# Patient Record
Sex: FEMALE | Race: WHITE | Employment: PART TIME | URBAN - METROPOLITAN AREA
[De-identification: names, ages, dates, MRNs, and addresses within clinical notes are randomized per-mention and may not be internally consistent; named-entity substitution may affect disease eponyms.]

---

## 2017-02-21 PROBLEM — O34.219 PREVIOUS CESAREAN SECTION COMPLICATING PREGNANCY: Status: ACTIVE | Noted: 2017-02-21

## 2017-02-21 PROBLEM — Z98.891 S/P CESAREAN SECTION: Status: ACTIVE | Noted: 2017-02-21

## 2022-04-14 ENCOUNTER — OFFICE VISIT (OUTPATIENT)
Dept: ENT CLINIC | Age: 32
End: 2022-04-14
Payer: COMMERCIAL

## 2022-04-14 VITALS
HEART RATE: 84 BPM | SYSTOLIC BLOOD PRESSURE: 133 MMHG | DIASTOLIC BLOOD PRESSURE: 77 MMHG | TEMPERATURE: 97.8 F | HEIGHT: 63 IN | BODY MASS INDEX: 35.12 KG/M2 | WEIGHT: 198.2 LBS

## 2022-04-14 DIAGNOSIS — H60.8X3 CHRONIC ECZEMATOUS OTITIS EXTERNA OF BOTH EARS: Primary | ICD-10-CM

## 2022-04-14 DIAGNOSIS — H60.93 RECURRENT OTITIS EXTERNA OF BOTH EARS: ICD-10-CM

## 2022-04-14 PROCEDURE — G8427 DOCREV CUR MEDS BY ELIG CLIN: HCPCS | Performed by: OTOLARYNGOLOGY

## 2022-04-14 PROCEDURE — 99204 OFFICE O/P NEW MOD 45 MIN: CPT | Performed by: OTOLARYNGOLOGY

## 2022-04-14 PROCEDURE — G8417 CALC BMI ABV UP PARAM F/U: HCPCS | Performed by: OTOLARYNGOLOGY

## 2022-04-14 PROCEDURE — 4130F TOPICAL PREP RX AOE: CPT | Performed by: OTOLARYNGOLOGY

## 2022-04-14 PROCEDURE — 4004F PT TOBACCO SCREEN RCVD TLK: CPT | Performed by: OTOLARYNGOLOGY

## 2022-04-14 RX ORDER — MOMETASONE FUROATE 1 MG/G
CREAM TOPICAL
Qty: 1 EACH | Refills: 3 | Status: SHIPPED | OUTPATIENT
Start: 2022-04-14

## 2022-04-14 ASSESSMENT — ENCOUNTER SYMPTOMS
SORE THROAT: 0
TROUBLE SWALLOWING: 0
SHORTNESS OF BREATH: 0
SINUS PRESSURE: 0
COUGH: 0
APNEA: 0
VOICE CHANGE: 0
EYE ITCHING: 0
FACIAL SWELLING: 0

## 2022-04-14 NOTE — PROGRESS NOTES
Kristi Noonan 24, [address], [address] [place] [address]  P: [phone]       Patient     Ethyl Anju  1990    ChiefComplaint     Chief Complaint   Patient presents with    Ear Problem     Patient is here for both her ears. She states she gets ear infections frequently. She just got over an ear infection beginning of February. .        History of Present Illness     James Jones is a 53-year-old female here today for evaluation of recurrent bilateral acute otitis externa. For the last several years she has had multiple episodes of smelly bilateral otorrhea and otalgia. Initially treated with oral antibiotics but symptoms did not resolve and therefore had to start eardrops with symptoms. No history of ear surgeries. Reports hearing is normal.  Does not use Q-tips. Nondiabetic. Last infection beginning of February. States ears feel dry and itchy between infections.     Past Medical History     Past Medical History:   Diagnosis Date    Cyst of soft tissue     Sebaceous Cyst       Past Surgical History     Past Surgical History:   Procedure Laterality Date     SECTION, LOW TRANSVERSE  2013       Family History     Family History   Problem Relation Age of Onset    High Blood Pressure Mother     Heart Disease Maternal Grandmother     High Blood Pressure Maternal Grandmother     High Blood Pressure Maternal Grandfather     Stroke Maternal Grandfather        Social History     Social History     Tobacco Use    Smoking status: Current Every Day Smoker     Packs/day: 0.25     Years: 10.00     Pack years: 2.50     Types: Cigarettes    Smokeless tobacco: Current User     Types: Snuff   Vaping Use    Vaping Use: Never used   Substance Use Topics    Alcohol use: Yes     Comment: social    Drug use: No        Allergies     No Known Allergies    Medications     Current Outpatient Medications   Medication Sig Dispense Refill    mometasone (ELOCON) 0.1 % cream Apply topically daily x 1 week then 1-2 times per week 1 each 3    ibuprofen (ADVIL;MOTRIN) 800 MG tablet Take 1 tablet by mouth every 6 hours as needed for Pain (Patient not taking: Reported on 4/14/2022) 30 tablet 0    Prenatal Multivit-Min-Fe-FA (PRENATAL #2 PO) Take 1 tablet by mouth daily (Patient not taking: Reported on 4/14/2022)       No current facility-administered medications for this visit. Review of Systems     Review of Systems   Constitutional: Negative for appetite change, chills, fatigue, fever and unexpected weight change. HENT: Positive for ear discharge and ear pain. Negative for congestion, facial swelling, hearing loss, nosebleeds, postnasal drip, sinus pressure, sneezing, sore throat, tinnitus, trouble swallowing and voice change. Eyes: Negative for itching. Respiratory: Negative for apnea, cough and shortness of breath. Endocrine: Negative for cold intolerance and heat intolerance. Musculoskeletal: Negative for myalgias and neck pain. Skin: Negative for rash. Allergic/Immunologic: Negative for environmental allergies. Neurological: Negative for dizziness and headaches. Psychiatric/Behavioral: Negative for confusion, decreased concentration and sleep disturbance. PhysicalExam     Vitals:    04/14/22 1413   BP: 133/77   Site: Left Upper Arm   Position: Sitting   Pulse: 84   Temp: 97.8 °F (36.6 °C)   TempSrc: Infrared   Weight: 198 lb 3.2 oz (89.9 kg)   Height: 5' 3\" (1.6 m)       Physical Exam  Constitutional:       General: She is not in acute distress. Appearance: She is well-developed. HENT:      Head: Normocephalic and atraumatic. Right Ear: Tympanic membrane, ear canal and external ear normal. No drainage. No middle ear effusion. Tympanic membrane is not bulging. Tympanic membrane has normal mobility. Left Ear: Tympanic membrane, ear canal and external ear normal. No drainage. No middle ear effusion. Tympanic membrane is not bulging.  Tympanic membrane has normal mobility. Nose: No mucosal edema or rhinorrhea. Mouth/Throat:      Lips: Pink. Mouth: Mucous membranes are moist.      Tongue: No lesions. Palate: No mass. Pharynx: Uvula midline. Eyes:      Pupils: Pupils are equal, round, and reactive to light. Neck:      Thyroid: No thyroid mass or thyromegaly. Trachea: Trachea and phonation normal.   Cardiovascular:      Pulses: Normal pulses. Pulmonary:      Effort: Pulmonary effort is normal. No accessory muscle usage or respiratory distress. Breath sounds: No stridor. Musculoskeletal:      Cervical back: Full passive range of motion without pain. Lymphadenopathy:      Head:      Right side of head: No submental or submandibular adenopathy. Left side of head: No submental or submandibular adenopathy. Cervical: No cervical adenopathy. Right cervical: No superficial, deep or posterior cervical adenopathy. Left cervical: No superficial, deep or posterior cervical adenopathy. Skin:     General: Skin is warm and dry. Neurological:      Mental Status: She is alert and oriented to person, place, and time. Cranial Nerves: No cranial nerve deficit. Coordination: Coordination normal.      Gait: Gait normal.   Psychiatric:         Thought Content: Thought content normal.           Assessment and Plan     1. Chronic eczematous otitis externa of both ears  - mometasone (ELOCON) 0.1 % cream; Apply topically daily x 1 week then 1-2 times per week  Dispense: 1 each; Refill: 3    2. Recurrent otitis externa of both ears      Recurrent episodes of bilateral otorrhea and otalgia consistent with recurrent otitis externa. Plan to treat for eczematous otitis externa which may be predisposing her to infections. Next time she has an infection she should return here for evaluation and culture. Michael Troy DO  4/14/22      Portions of this note were dictated using Dragon.  There may be linguistic errors secondary to the use of this program.